# Patient Record
Sex: FEMALE | Race: BLACK OR AFRICAN AMERICAN
[De-identification: names, ages, dates, MRNs, and addresses within clinical notes are randomized per-mention and may not be internally consistent; named-entity substitution may affect disease eponyms.]

---

## 2018-08-21 ENCOUNTER — HOSPITAL ENCOUNTER (OUTPATIENT)
Dept: HOSPITAL 58 - RAD | Age: 68
Discharge: HOME | End: 2018-08-21
Attending: INTERNAL MEDICINE

## 2018-08-21 DIAGNOSIS — Z12.31: Primary | ICD-10-CM

## 2018-08-21 PROCEDURE — 77067 SCR MAMMO BI INCL CAD: CPT

## 2018-08-22 NOTE — MAMMO
EXAM:  Bilateral digital screening mammogram (2-D and 3-D) 

  

History:  Screening 

  

Comparison:  None available. 

  

Findings:  MLO and CC views of bilateral breasts demonstrate scattered fibroglandular breast parenchy
ma.  CAD was reviewed by the radiologist.  Tomosynthesis was performed.  There are no dominant masses
, no suspicious microcalcifications and no architectural distortions 

  

Impression:  Negative mammogram.  Recommend followup routine screening mammography in 1 year. 

  

BIRADS 1